# Patient Record
(demographics unavailable — no encounter records)

---

## 2025-04-21 NOTE — ASSESSMENT
[Vaccines Reviewed] : Immunizations reviewed today. Please see immunization details in the vaccine log within the immunization flowsheet. 
[Vaccines Reviewed] : Immunizations reviewed today. Please see immunization details in the vaccine log within the immunization flowsheet. 
no
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
## 2025-04-25 NOTE — END OF VISIT
[FreeTextEntry3] : I, Dr. Liz, personally performed the evaluation and management (E/M) services for this established patient who presents today with (a) new problem(s)/exacerbation of (an) existing condition(s).  That E/M includes conducting the examination, assessing all new/exacerbated conditions, and establishing a new plan of care.  Today, my PA, Vika Joy, was here to observe my evaluation and management services for this new problem/exacerbated condition to be followed going forward.

## 2025-04-25 NOTE — HEALTH RISK ASSESSMENT
[3] : 1) Little interest or pleasure doing things for nearly every day (3) [0] : 2) Feeling down, depressed, or hopeless: Not at all (0) [PHQ-2 Positive] : PHQ-2 Positive [[id number]] : 3 [Never] : Never [Financial] : financial [Transportation] : transportation [Food] : food [Employment] : employment [Housing] : housing [Health Literacy] : health literacy [On disability] : on disability [Reports changes in hearing] : Reports no changes in hearing [Reports changes in vision] : Reports no changes in vision

## 2025-04-25 NOTE — HEALTH RISK ASSESSMENT
[3] : 1) Little interest or pleasure doing things for nearly every day (3) [0] : 2) Feeling down, depressed, or hopeless: Not at all (0) [PHQ-2 Positive] : PHQ-2 Positive [[id number]] : 3 [Never] : Never [Financial] : financial [Transportation] : transportation [Food] : food [Employment] : employment [Housing] : housing [Health Literacy] : health literacy [On disability] : on disability [Reports changes in hearing] : Reports no changes in hearing [Reports changes in vision] : Reports no changes in vision

## 2025-07-22 NOTE — PHYSICAL EXAM
[No Acute Distress] : no acute distress [Urethral Meatus] : meatus normal [Testes Tenderness] : no tenderness of the testes [Testes Mass (___cm)] : there were no testicular masses [Normal] : affect was normal and insight and judgment were intact

## 2025-07-22 NOTE — REVIEW OF SYSTEMS
[Negative] : Respiratory [Dysuria] : no dysuria [Incontinence] : no incontinence [Hesitancy] : no hesitancy [Nocturia] : no nocturia [Hematuria] : no hematuria [Frequency] : no frequency [Impotence] : no impotency [FreeTextEntry8] : + L scrotal discomfort, no rash, swelling, masses palpated

## 2025-07-22 NOTE — HISTORY OF PRESENT ILLNESS
[FreeTextEntry1] :  f/u of prediabetes and HLD [de-identified] : Pt is a 35 y/o M with PMHx of morbid obesity, HLD, prediabetes, CRPS who presents to the office today for f/u of prediabetes and HLD  Pt reports he has not been able to make any dietary changes. Physical activity is limited due to orthopedic injuries. He is attending PT 2 x a week. He is still living in a hotel room.  Medications are the same, but he does not know dosages.

## 2025-07-22 NOTE — CURRENT MEDS
[Takes medication as prescribed] : takes [None] : Patient does not have any barriers to medication adherence [Cost] : cost [Lack of understanding] : lack of understanding [Yes] : Reviewed medication list for presence of high-risk medications. [Benzodiazepines] : benzodiazepines [Muscle Relaxants] : muscle relaxants